# Patient Record
Sex: FEMALE | Race: WHITE | NOT HISPANIC OR LATINO | Employment: FULL TIME | URBAN - METROPOLITAN AREA
[De-identification: names, ages, dates, MRNs, and addresses within clinical notes are randomized per-mention and may not be internally consistent; named-entity substitution may affect disease eponyms.]

---

## 2017-08-02 ENCOUNTER — HOSPITAL ENCOUNTER (EMERGENCY)
Facility: HOSPITAL | Age: 32
Discharge: HOME | End: 2017-08-02
Attending: EMERGENCY MEDICINE | Admitting: EMERGENCY MEDICINE
Payer: COMMERCIAL

## 2017-08-02 VITALS
HEART RATE: 90 BPM | HEIGHT: 61 IN | BODY MASS INDEX: 26.22 KG/M2 | TEMPERATURE: 98.5 F | DIASTOLIC BLOOD PRESSURE: 82 MMHG | WEIGHT: 138.89 LBS | SYSTOLIC BLOOD PRESSURE: 117 MMHG | RESPIRATION RATE: 18 BRPM | OXYGEN SATURATION: 100 %

## 2017-08-02 DIAGNOSIS — F41.0 ANXIETY ATTACK: Primary | ICD-10-CM

## 2017-08-02 DIAGNOSIS — E87.6 HYPOKALEMIA: ICD-10-CM

## 2017-08-02 LAB
ANION GAP SERPL CALCULATED.3IONS-SCNC: 8 MMOL/L (ref 4–13)
ATRIAL RATE: 89 BPM
BASOPHILS # BLD AUTO: 0.03 THOUSANDS/ΜL (ref 0–0.1)
BASOPHILS NFR BLD AUTO: 1 % (ref 0–1)
BUN SERPL-MCNC: 11 MG/DL (ref 5–25)
CALCIUM SERPL-MCNC: 9.1 MG/DL (ref 8.3–10.1)
CHLORIDE SERPL-SCNC: 105 MMOL/L (ref 100–108)
CO2 SERPL-SCNC: 27 MMOL/L (ref 21–32)
CREAT SERPL-MCNC: 0.73 MG/DL (ref 0.6–1.3)
EOSINOPHIL # BLD AUTO: 0.03 THOUSAND/ΜL (ref 0–0.61)
EOSINOPHIL NFR BLD AUTO: 1 % (ref 0–6)
ERYTHROCYTE [DISTWIDTH] IN BLOOD BY AUTOMATED COUNT: 12.3 % (ref 11.6–15.1)
GFR SERPL CREATININE-BSD FRML MDRD: 109 ML/MIN/1.73SQ M
GLUCOSE SERPL-MCNC: 114 MG/DL (ref 65–140)
HCG UR QL: NEGATIVE
HCT VFR BLD AUTO: 40.9 % (ref 34.8–46.1)
HGB BLD-MCNC: 13.7 G/DL (ref 11.5–15.4)
LYMPHOCYTES # BLD AUTO: 1.74 THOUSANDS/ΜL (ref 0.6–4.47)
LYMPHOCYTES NFR BLD AUTO: 35 % (ref 14–44)
MCH RBC QN AUTO: 31.9 PG (ref 26.8–34.3)
MCHC RBC AUTO-ENTMCNC: 33.5 G/DL (ref 31.4–37.4)
MCV RBC AUTO: 95 FL (ref 82–98)
MONOCYTES # BLD AUTO: 0.67 THOUSAND/ΜL (ref 0.17–1.22)
MONOCYTES NFR BLD AUTO: 13 % (ref 4–12)
NEUTROPHILS # BLD AUTO: 2.57 THOUSANDS/ΜL (ref 1.85–7.62)
NEUTS SEG NFR BLD AUTO: 51 % (ref 43–75)
NRBC BLD AUTO-RTO: 0 /100 WBCS
P AXIS: 64 DEGREES
PLATELET # BLD AUTO: 251 THOUSANDS/UL (ref 149–390)
PMV BLD AUTO: 10 FL (ref 8.9–12.7)
POTASSIUM SERPL-SCNC: 3.3 MMOL/L (ref 3.5–5.3)
PR INTERVAL: 142 MS
QRS AXIS: 73 DEGREES
QRSD INTERVAL: 84 MS
QT INTERVAL: 320 MS
QTC INTERVAL: 389 MS
RBC # BLD AUTO: 4.29 MILLION/UL (ref 3.81–5.12)
SODIUM SERPL-SCNC: 140 MMOL/L (ref 136–145)
T WAVE AXIS: 35 DEGREES
TSH SERPL DL<=0.05 MIU/L-ACNC: 1.2 UIU/ML (ref 0.36–3.74)
VENTRICULAR RATE: 89 BPM
WBC # BLD AUTO: 5.05 THOUSAND/UL (ref 4.31–10.16)

## 2017-08-02 PROCEDURE — 80048 BASIC METABOLIC PNL TOTAL CA: CPT | Performed by: EMERGENCY MEDICINE

## 2017-08-02 PROCEDURE — 85025 COMPLETE CBC W/AUTO DIFF WBC: CPT | Performed by: EMERGENCY MEDICINE

## 2017-08-02 PROCEDURE — 81025 URINE PREGNANCY TEST: CPT | Performed by: EMERGENCY MEDICINE

## 2017-08-02 PROCEDURE — 99284 EMERGENCY DEPT VISIT MOD MDM: CPT

## 2017-08-02 PROCEDURE — 93005 ELECTROCARDIOGRAM TRACING: CPT | Performed by: EMERGENCY MEDICINE

## 2017-08-02 PROCEDURE — 84443 ASSAY THYROID STIM HORMONE: CPT | Performed by: EMERGENCY MEDICINE

## 2017-08-02 PROCEDURE — 36415 COLL VENOUS BLD VENIPUNCTURE: CPT | Performed by: EMERGENCY MEDICINE

## 2017-08-02 RX ORDER — POTASSIUM CHLORIDE 20 MEQ/1
40 TABLET, EXTENDED RELEASE ORAL ONCE
Status: COMPLETED | OUTPATIENT
Start: 2017-08-02 | End: 2017-08-02

## 2017-08-02 RX ADMIN — POTASSIUM CHLORIDE 40 MEQ: 1500 TABLET, EXTENDED RELEASE ORAL at 17:27

## 2018-06-03 ENCOUNTER — OFFICE VISIT (OUTPATIENT)
Dept: URGENT CARE | Facility: CLINIC | Age: 33
End: 2018-06-03
Payer: COMMERCIAL

## 2018-06-03 VITALS
BODY MASS INDEX: 26.61 KG/M2 | DIASTOLIC BLOOD PRESSURE: 62 MMHG | OXYGEN SATURATION: 100 % | WEIGHT: 144.6 LBS | SYSTOLIC BLOOD PRESSURE: 94 MMHG | HEIGHT: 62 IN | HEART RATE: 81 BPM | TEMPERATURE: 98.3 F | RESPIRATION RATE: 16 BRPM

## 2018-06-03 DIAGNOSIS — J02.9 ACUTE VIRAL PHARYNGITIS: Primary | ICD-10-CM

## 2018-06-03 DIAGNOSIS — J06.9 UPPER RESPIRATORY INFECTION, VIRAL: ICD-10-CM

## 2018-06-03 PROCEDURE — 99213 OFFICE O/P EST LOW 20 MIN: CPT | Performed by: PHYSICIAN ASSISTANT

## 2018-06-03 PROCEDURE — S9088 SERVICES PROVIDED IN URGENT: HCPCS | Performed by: PHYSICIAN ASSISTANT

## 2018-06-03 NOTE — PATIENT INSTRUCTIONS
Attached are medications that are safe early in pregnancy  Recommend taking guaifenesin and dextromethorphan  Saltwater gargles for sore throat  Tylenol as needed for fever or pain  Follow up with PCP in 3-5 days  Proceed to  ER if symptoms worsen

## 2018-06-03 NOTE — LETTER
Danita 3, 2018     Patient: Vasyl Milligan   YOB: 1985   Date of Visit: 6/3/2018       To Whom it May Concern:    Vasyl Milligan is under my professional care  She was seen in my office on 6/3/2018  She  may return to work when she is fever and symptom free  If you have any questions or concerns, please don't hesitate to call           Sincerely,          Fiorella Casas PA-C        CC: No Recipients

## 2018-06-03 NOTE — PROGRESS NOTES
[id number]        NAME: Meera Bernardo is a 35 y o  female  : 1985    MRN: [id number]  DATE: Danita 3, 2018  TIME: 1:39 PM    Assessment and Plan   Acute viral pharyngitis [J02 8, B97 89]  1  Acute viral pharyngitis     2  Upper respiratory infection, viral           Patient Instructions   Attached are medications that are safe early in pregnancy  Recommend taking guaifenesin and dextromethorphan  Saltwater gargles for sore throat  Tylenol as needed for fever or pain  Follow up with PCP in 3-5 days  Proceed to  ER if symptoms worsen  Chief Complaint     Chief Complaint   Patient presents with    Sore Throat     started last night  feels like throat is on fire   Cold Like Symptoms     nasal congestion started this morning  is also 9 weeks pregnant  History of Present Illness   The patient is a 70-year-old female who presents with a sore throat and cold-like symptoms for approximately 3 days  She states that she is 9 weeks pregnant  She has not taken anything for her symptoms  She has a severe sore throat with difficulty swallowing  Negative voice change  She is able to swallow her secretions  Positive nonproductive cough  Positive sinus pressure and pain without point tenderness  Positive ear pressure bilaterally  Negative abdominal pain, nausea, vomiting or diarrhea  Negative fever or chills  Positive fatigue  Positive decreased appetite  She denies abdominal cramping or vaginal bleeding  HPI    Review of Systems   Review of Systems   Constitutional: Positive for activity change, appetite change, fatigue and fever  Negative for chills and unexpected weight change  HENT: Positive for congestion, ear pain, postnasal drip, rhinorrhea, sinus pressure, sore throat and trouble swallowing  Negative for ear discharge, facial swelling, mouth sores, sinus pain, sneezing and tinnitus  Eyes: Negative for pain, discharge, redness and itching     Respiratory: Positive for cough  Negative for apnea, chest tightness, shortness of breath, wheezing and stridor  Cardiovascular: Negative for chest pain  Gastrointestinal: Negative for abdominal distention, abdominal pain, diarrhea, nausea and vomiting  Genitourinary: Negative for difficulty urinating  Musculoskeletal: Negative for arthralgias and myalgias  Skin: Negative for pallor and rash  Allergic/Immunologic: Negative  Neurological: Positive for dizziness and headaches  Negative for light-headedness  Hematological: Negative  Psychiatric/Behavioral: Negative  All other systems reviewed and are negative  Current Medications       Current Outpatient Prescriptions:     Prenatal MV-Min-Fe Fum-FA-DHA (PRENATAL+DHA PO), Take by mouth, Disp: , Rfl:     Probiotic Product (PROBIOTIC ADVANCED PO), Take 1 tablet by mouth as needed, Disp: , Rfl:     norethindrone-ethinyl estradiol-iron (ESTROSTEP FE) 1-20/1-30/1-35 MG-MCG TABS, Take by mouth daily, Disp: , Rfl:     Current Allergies     Allergies as of 06/03/2018    (No Known Allergies)            The following portions of the patient's history were reviewed and updated as appropriate: allergies, current medications, past family history, past medical history, past social history, past surgical history and problem list      Past Medical History:   Diagnosis Date    Asthma        History reviewed  No pertinent surgical history  No family history on file  Medications have been verified  Objective   BP 94/62 (BP Location: Left arm, Patient Position: Sitting)   Pulse 81   Temp 98 3 °F (36 8 °C) (Oral)   Resp 16   Ht 5' 2" (1 575 m)   Wt 65 6 kg (144 lb 9 6 oz)   LMP 03/26/2018 (Approximate)   SpO2 100%   BMI 26 45 kg/m²        Physical Exam     Physical Exam   Constitutional: She is oriented to person, place, and time  She appears well-developed and well-nourished  She appears distressed (the patient is crying)     HENT:   Head: Normocephalic and atraumatic  Right Ear: Hearing, tympanic membrane, external ear and ear canal normal  No drainage or tenderness  Tympanic membrane is not perforated, not erythematous, not retracted and not bulging  No middle ear effusion  No decreased hearing is noted  Left Ear: Hearing, tympanic membrane, external ear and ear canal normal  No drainage or tenderness  Tympanic membrane is not perforated, not erythematous, not retracted and not bulging  No middle ear effusion  No decreased hearing is noted  Nose: Rhinorrhea present  No mucosal edema or septal deviation  Right sinus exhibits no maxillary sinus tenderness and no frontal sinus tenderness  Left sinus exhibits no maxillary sinus tenderness and no frontal sinus tenderness  Mouth/Throat: Uvula is midline and mucous membranes are normal  No oral lesions  No dental abscesses or uvula swelling  Posterior oropharyngeal edema and posterior oropharyngeal erythema present  No oropharyngeal exudate or tonsillar abscesses  Eyes: Conjunctivae and EOM are normal  Pupils are equal, round, and reactive to light  Neck: Trachea normal, normal range of motion and full passive range of motion without pain  Neck supple  No JVD present  No edema and no erythema present  No thyromegaly present  Cardiovascular: Normal rate, regular rhythm, S1 normal, S2 normal, normal heart sounds, intact distal pulses and normal pulses  No murmur heard  Pulmonary/Chest: Effort normal and breath sounds normal  No accessory muscle usage or stridor  No respiratory distress  She has no wheezes  Abdominal: Soft  Normal appearance and bowel sounds are normal  She exhibits no distension  There is no hepatosplenomegaly  There is no tenderness  Musculoskeletal: Normal range of motion  She exhibits no edema  Lymphadenopathy:        Head (right side): Submental, submandibular and tonsillar adenopathy present  Head (left side): Submental, submandibular and tonsillar adenopathy present  Neurological: She is alert and oriented to person, place, and time  Skin: Skin is warm, dry and intact  No abrasion, no lesion and no rash noted  She is not diaphoretic  No cyanosis or erythema  Nails show no clubbing  Psychiatric: She has a normal mood and affect  Her speech is normal and behavior is normal    Nursing note and vitals reviewed